# Patient Record
Sex: MALE | Race: WHITE | NOT HISPANIC OR LATINO | URBAN - METROPOLITAN AREA
[De-identification: names, ages, dates, MRNs, and addresses within clinical notes are randomized per-mention and may not be internally consistent; named-entity substitution may affect disease eponyms.]

---

## 2022-07-18 ENCOUNTER — OFFICE VISIT (OUTPATIENT)
Dept: DERMATOLOGY | Facility: CLINIC | Age: 32
End: 2022-07-18
Payer: COMMERCIAL

## 2022-07-18 DIAGNOSIS — B07.9 VERRUCA: Primary | ICD-10-CM

## 2022-07-18 DIAGNOSIS — B36.0 TINEA VERSICOLOR: ICD-10-CM

## 2022-07-18 PROCEDURE — 99204 OFFICE O/P NEW MOD 45 MIN: CPT | Mod: 25,S$GLB,, | Performed by: DERMATOLOGY

## 2022-07-18 PROCEDURE — 1159F PR MEDICATION LIST DOCUMENTED IN MEDICAL RECORD: ICD-10-PCS | Mod: CPTII,S$GLB,, | Performed by: DERMATOLOGY

## 2022-07-18 PROCEDURE — 99999 PR PBB SHADOW E&M-NEW PATIENT-LVL III: CPT | Mod: PBBFAC,,, | Performed by: DERMATOLOGY

## 2022-07-18 PROCEDURE — 99204 PR OFFICE/OUTPT VISIT, NEW, LEVL IV, 45-59 MIN: ICD-10-PCS | Mod: 25,S$GLB,, | Performed by: DERMATOLOGY

## 2022-07-18 PROCEDURE — 17110 DESTRUCTION B9 LES UP TO 14: CPT | Mod: S$GLB,,, | Performed by: DERMATOLOGY

## 2022-07-18 PROCEDURE — 1159F MED LIST DOCD IN RCRD: CPT | Mod: CPTII,S$GLB,, | Performed by: DERMATOLOGY

## 2022-07-18 PROCEDURE — 99999 PR PBB SHADOW E&M-NEW PATIENT-LVL III: ICD-10-PCS | Mod: PBBFAC,,, | Performed by: DERMATOLOGY

## 2022-07-18 PROCEDURE — 17110 PR DESTRUCTION BENIGN LESIONS UP TO 14: ICD-10-PCS | Mod: S$GLB,,, | Performed by: DERMATOLOGY

## 2022-07-18 RX ORDER — FLUCONAZOLE 200 MG/1
TABLET ORAL
Qty: 8 TABLET | Refills: 1 | Status: SHIPPED | OUTPATIENT
Start: 2022-07-18

## 2022-07-18 RX ORDER — SODIUM SULFACETAMIDE AND SULFUR 80; 40 MG/ML; MG/ML
SOLUTION TOPICAL
Qty: 473 ML | Refills: 5 | Status: SHIPPED | OUTPATIENT
Start: 2022-07-18

## 2022-07-18 RX ORDER — KETOCONAZOLE 20 MG/G
CREAM TOPICAL
Qty: 60 G | Refills: 3 | Status: SHIPPED | OUTPATIENT
Start: 2022-07-18

## 2022-07-18 NOTE — PATIENT INSTRUCTIONS
Counseled Diflucan can have side affects such as change in taste, diarrhea, headache, nausea, these are not urgent, please report if bothersome. If a rash or feeling faint or having palpations please seek urgent care. Handout given in [facility]    Handout given nature of disease explained that oily skin and sweating can exacerbate condition  Start sulfa wash or  Vanicream Z-bar soap or sebamed daily to prevent future reoccurances    Use keto cream to chest and back 3 x a week once clear    PITYRIASIS VERSICOLOR Patient information    What are the aims of this leaflet?  This leaflet has been written to help you understand more about pityriasis versicolor.  It tells you what it is, what causes it, what can be done about it, and where you can  find out more about it.    What is pityriasis versicolor?  Pityriasis means a type of fine skin scaling, and versicolor means changing colour. It  is a common and harmless rash due to the overgrowth of yeasts that live on everyones  skin. These yeasts, called Malassezia, are not related to yeast in food or to those that  cause thrush. It is also sometimes called tinea versicolor.    What causes it?  Large numbers of tiny harmless organisms, known as the resident alex, live on the  surface of everyones skin. Some of them are yeasts. At times, these yeasts can  overgrow and trigger the rash known as pityriasis versicolor. This happens most often  in warm moist climates. Most people with this condition are in good health.  The rash has a mixture of colours- hence the name versicolor. It looks pale brown or  pink on untanned, white skin, but sunlight will cause the yeasts to make chemicals that  prevent tanning, so the rash stays paler than the surrounding skin. For this reason, the  rash is often noticed for the first time after exposure to the sun such as following a  georgi holiday.  The condition is most common in people in their early 20s. People who get it may also  have  dandruff which is caused by a similar yeast overgrowth on the scalp. We do not  understand why some people tend to get it and others do not.    Is it hereditary?  No.    What are its symptoms?  The rash can be mildly itchy but usually causes no trouble apart from its appearance.  It often goes unnoticed if only a few patches are present.    What does it look like?  The rash usually affects the torso but can also affect the upper arms, neck and  stomach. Flat, slightly scaly areas of altered colour appear more obvious against a  background of unaffected skin. The colour of the patches may vary from pale to dark  brown. On white skin the affected areas usually appear darker than surrounding skin  and in dark skinned people the affected patches can appear pale.    \How will it be diagnosed?  The diagnosis is usually made by your doctor looking at the rash. A special ultra-violet  lamp, known as Woods lamp, can be used to look for yellow fluorescence which is  typical of pityriasis versicolor. Sometimes your doctor may take skin scrapings to  confirm the diagnosis. If there is any doubt, very occasionally a skin (biopsy) [this is  when a small sample of skin is cut out to look at under the microscope] may be  considered.    Can it be cured?  Yes, the rash clears with treatment although the pale areas will take several months  to return to their normal colour. Importantly, this does not mean treatment has failed.  The rash often recurs as the yeasts that cause it to live on normal skin and cannot be  eradicated completely. Pityriasis versicolor does not leave scars.    How can it be treated?  Treatments applied to the skin:  Most patients are treated with topical antifungal agents that are applied to the skin.  Treatments that reduce the amount of skin yeasts include terbinafine cream,  clotrimazole cream and miconazole cream. Ketoconazole and selenium sulphide  shampoos can be used as body washes but should not be left  on the skin.  Medicines taken by mouth:  These include itraconazole and fluconazole, which can be prescribed by your doctor.  They are effective but can have side effects so are usually prescribed for widespread  rashes, or when topical treatment has failed.    Recurrences:  The rash of pityriasis versicolor often comes back. Occasional use of an anti-dandruff  shampoo as a bodywash may reduce the chance of this happening.       What can I do?  It may help if you wash with an anti-dandruff shampoo for a few weeks before you go  on a georgi holiday to reduce the level of skin yeasts. There is no evidence that the  complaint is related to yeast in food so there is no need to change your diet.  Where can I get more information about it?  Web links to detailed leaflets:  http://www.[facility].org/fungal/pityriasis-versicolor.html  http://www.patient.co.uk/doctor/pityriasis-versicolor  For details of source materials used please contact the Clinical Standards Unit  (clinicalstandards@[facility].org.uk).  This leaflet aims to provide accurate information about the subject and is a  consensus of the views held by representatives of the [employer]: individual patient circumstances may differ, which might alter  both the advice and course of therapy given to you by your doctor.  This leaflet has been assessed for readability by the [employer] Patient Information Lay Review Panel  [employer]  PATIENT INFORMATION LEAFLET  PRODUCED MAY 2008  UPDATED AUGUST 2011, AUGUST 2014, NOVEMBER 2017  REVIEW DATE NOVEMBER 2020   Shave Biopsy Wound Care    Your doctor has performed a shave biopsy today.  A band aid and vaseline ointment has been placed over the site.  This should remain in place for NO LONGER THAN 48 hours.  It is fine to remove the bandaid after 24 hours, if the area is no longer bleeding. It is recommended that you keep the area dry (do not wet))  for the first 24 hours.  After 24 hours, wash the area with warm soap and water and apply Vaseline jelly.  Many patients prefer to use Neosporin or Bacitracin ointment.  This is acceptable; however, know that you can develop an allergy to this medication even if you have used it safely for years.  It is important to keep the area moist.  Letting it dry out and get air slows healing time, and will worsen the scar.        If you notice increasing redness, tenderness, pain, or yellow drainage at the biopsy site, please notify your doctor.  These are signs of an infection.    If your biopsy site is bleeding, apply firm pressure for 15 minutes straight.  Repeat for another 15 minutes, if it is still bleeding.   If the surgical site continues to bleed, then please contact your doctor.      For [facility] users:   You will receive your biopsy results in [facility] as soon as they are available. Please be assured that your physician/provider will review your results and will then determine what further treatment, evaluation, or planning is required. You should be contacted by your physician's/provider's office within 5 business days of receiving your results; If not, please reach out to directly. This is one more way [facility] is putting you first.     [facility] [place] [address]/ [phone] FAX/ [email or web address]

## 2022-07-18 NOTE — PROGRESS NOTES
Subjective:       Patient ID:  Ramona Farrar is a 32 y.o. male who presents for   Chief Complaint   Patient presents with    Tinea     Abdomen chest and back     Warts     Tinea - Initial  Affected locations: abdomen, chest and back  Duration: 1 year  Signs / symptoms: asymptomatic  Severity: mild  Timing: constant  Aggravated by: nothing  Relieving factors/Treatments tried: nothing  Improvement on treatment: no relief    Warts - Initial  Affected locations: right foot  Duration: 1 year  Signs / symptoms: irritated  Severity: mild  Timing: constant  Aggravated by: nothing  Relieving factors/Treatments tried: nothing  Improvement on treatment: no relief        Review of Systems   Constitutional: Negative for fever, chills and fatigue.   Skin: Negative for daily sunscreen use, recent sunburn and wears hat.   Hematologic/Lymphatic: Does not bruise/bleed easily.        Objective:    Physical Exam   Constitutional: He appears well-developed and well-nourished. No distress.   Neurological: He is alert and oriented to person, place, and time. He is not disoriented.   Psychiatric: He has a normal mood and affect.   Skin:   Areas Examined (abnormalities noted in diagram):   Chest / Axilla Inspection Performed  Back Inspection Performed  RUE Inspected  LUE Inspection Performed  Nails and Digits Inspection Performed                  Diagram Legend     Erythematous scaling macule/papule c/w actinic keratosis       Vascular papule c/w angioma      Pigmented verrucoid papule/plaque c/w seborrheic keratosis      Yellow umbilicated papule c/w sebaceous hyperplasia      Irregularly shaped tan macule c/w lentigo     1-2 mm smooth white papules consistent with Milia      Movable subcutaneous cyst with punctum c/w epidermal inclusion cyst      Subcutaneous movable cyst c/w pilar cyst      Firm pink to brown papule c/w dermatofibroma      Pedunculated fleshy papule(s) c/w skin tag(s)      Evenly pigmented macule c/w junctional  nevus     Mildly variegated pigmented, slightly irregular-bordered macule c/w mildly atypical nevus      Flesh colored to evenly pigmented papule c/w intradermal nevus       Pink pearly papule/plaque c/w basal cell carcinoma      Erythematous hyperkeratotic cursted plaque c/w SCC      Surgical scar with no sign of skin cancer recurrence      Open and closed comedones      Inflammatory papules and pustules      Verrucoid papule consistent consistent with wart     Erythematous eczematous patches and plaques     Dystrophic onycholytic nail with subungual debris c/w onychomycosis     Umbilicated papule    Erythematous-base heme-crusted tan verrucoid plaque consistent with inflamed seborrheic keratosis     Erythematous Silvery Scaling Plaque c/w Psoriasis     See annotation                              Assessment / Plan:        Verruca  Procedure note for destruction via shave debulking:    Discussed risks of procedure including but not limited to infection, persistence of lesion, recurrence of lesion, and scar. Verbal consent obtained. Area cleaned with alcohol and anesthetized with 1% lidocaine with epinephrine. 1 lesion(s) shaved with sharp razor then base destroyed with hyfrecation. No complications.    Start OTC wart treatment to area 40% sal acid   \  Tinea versicolor  -     ketoconazole (NIZORAL) 2 % cream; AAA bid  Dispense: 60 g; Refill: 3  -     sulfacetamide sodium-sulfur (SULFACLEANSE 8-4) 8-4 % Susp; Wash face qd - bid  Dispense: 473 mL; Refill: 5  -     fluconazole (DIFLUCAN) 200 MG Tab; 1 po biw x 4 weeks  Dispense: 8 tablet; Refill: 1    Counseled Diflucan can have side affects such as change in taste, diarrhea, headache, nausea, these are not urgent, please report if bothersome. If a rash or feeling faint or having palpations please seek urgent care. Handout given in [facility]    Handout given nature of disease explained that oily skin and sweating can exacerbate condition  Start sulfa wash or  Vanicream Z-bar  soap or sebamed daily to prevent future reoccurances    Use keto cream to chest and back 3 x a week once clear      F/u 1 year or sooner if [email or web address]         No follow-ups on file.